# Patient Record
(demographics unavailable — no encounter records)

---

## 2025-05-08 NOTE — PHYSICAL EXAM
[Abdominal Masses] : No abdominal masses [Abdomen Tenderness] : ~T ~M No abdominal tenderness [Alert] : alert [Oriented to Person] : oriented to person [Oriented to Place] : oriented to place [Oriented to Time] : oriented to time [Calm] : calm [de-identified] : She  is alert, well-groomed, and in NAD   [de-identified] : anicteric.  Nasal mucosa pink, septum midline. Oral mucosa pink.  Tongue midline, Pharynx without exudates.   [de-identified] : Neck supple. Trachea midline. Thyroid isthmus barely palpable, lobes not felt.   [de-identified] : no hernia

## 2025-05-08 NOTE — PLAN
[FreeTextEntry1] : Ms. ZARATE  was told significance of findings, options, risks and benefits were explained.  Informed consent for laparoscopic/possible open  cholecystectomy  and potential risks, benefits and alternatives (surgical options were discussed including non-surgical options or the option of no surgery) to the planned surgery were discussed in depth.  All surgical options were discussed including non-surgical treatments.  She wishes to proceed with surgery.  We will plan for surgery on at the next available date, pending any required insurance pre-certification or pre-approval. She agrees to obtain any necessary pre-operative evaluations and testing prior to surgery. Patient instructed to maintain a fat-free diet, and to seek immediate medical attention with any acute change or worsening of symptoms, including but not limited to abdominal pain, fever, chills, nausea, vomiting, or yellowing of the skin.  Patient advised to seek immediate medical attention with any acute change in symptoms or with the development of any new or worsening symptoms.  Patient's questions and concerns addressed to patient's satisfaction, and patient verbalized an understanding of the information discussed.

## 2025-05-08 NOTE — HISTORY OF PRESENT ILLNESS
[de-identified] : Ms. JOY ZARATE is a 51 year  old patient who was referred by Dr. Ryan Keating      She had an abdominal sonogram on  04/03/2025 which revealed Cholelithiasis and gallbladder adenomyomatosis. CBD is normal      [de-identified] : Ms. JOY ZARATE is a 51 year  old patient who was referred by Dr. Ryan Keating    with the chief complaint of having right upper quadrant and epigastric pain on and off  for 1 month. Pain is non-radiating.    She reports no nausea or vomiting and no history of jaundice, acholia or acholuria.   Appetite is good and weight is stable.   She   has  family history of biliary tract disease in her mother.     She had an abdominal sonogram on  04/03/2025 which revealed Cholelithiasis and gallbladder adenomyomatosis. CBD is normal

## 2025-05-08 NOTE — DATA REVIEWED
[FreeTextEntry1] : Patient Name JOY ZARATE Date of Birth 1974-04-09 Procedure US ABDOMEN Study Date & Time 2025-04-03 8:08 AM Patient ID [id number] Accession Number [id number] Referring Physician BHARAT VERMA Institution Name [facility] RADIOLOGY REPORT FINDINGS FINDING HISTORY: Right flank pain. TECHNIQUE: Ultrasound of the abdomen was performed. COMPARISON: Abdominal ultrasound 8/18/2020. FINDINGS: Liver: Normal size and echogenicity. No focal liver lesion. Biliary Ducts: CBD measures 6 mm. No ductal dilatation. Gallbladder: Normally distended, containing a 2.4 cm gallstone. No gallbladder wall thickening or pericholecystic fluid. Punctate hyperechoic foci in the gallbladder wall with associated comet tail artifact, likely representing adenomyomatosis. Negative sonographic Jefferson's sign. Pancreas: Visualized portions unremarkable. Spleen: 7.8 cm in length. Right Kidney: 10.4 cm in length. Normal echogenicity. No hydronephrosis. Left Kidney: 10.2 cm in length. Normal echogenicity. No hydronephrosis. Aorta and IVC: Visualized portions are unremarkable. Other: No ascites. IMPRESSION: Cholelithiasis and gallbladder adenomyomatosis. No evidence of acute cholecystitis. No renal stones or hydronephrosis identified. Electronically signed by: Jovani Orta MD 4/3/2025 1:01 PM Workstation: [id number] 5/6/25, 10:33 AM [employer] [email or web address] Electronically Signed By: Jovani Orta MD Sign Date: 03-APR-25 [facility]

## 2025-05-08 NOTE — PHYSICAL EXAM
[Abdominal Masses] : No abdominal masses [Abdomen Tenderness] : ~T ~M No abdominal tenderness [Alert] : alert [Oriented to Person] : oriented to person [Oriented to Place] : oriented to place [Oriented to Time] : oriented to time [Calm] : calm [de-identified] : She  is alert, well-groomed, and in NAD   [de-identified] : anicteric.  Nasal mucosa pink, septum midline. Oral mucosa pink.  Tongue midline, Pharynx without exudates.   [de-identified] : Neck supple. Trachea midline. Thyroid isthmus barely palpable, lobes not felt.   [de-identified] : no hernia

## 2025-05-08 NOTE — HISTORY OF PRESENT ILLNESS
[de-identified] : Ms. JOY ZARATE is a 51 year  old patient who was referred by Dr. Ryan Keating      She had an abdominal sonogram on  04/03/2025 which revealed Cholelithiasis and gallbladder adenomyomatosis. CBD is normal      [de-identified] : Ms. JOY ZARATE is a 51 year  old patient who was referred by Dr. Ryan Keating    with the chief complaint of having right upper quadrant and epigastric pain on and off  for 1 month. Pain is non-radiating.    She reports no nausea or vomiting and no history of jaundice, acholia or acholuria.   Appetite is good and weight is stable.   She   has  family history of biliary tract disease in her mother.     She had an abdominal sonogram on  04/03/2025 which revealed Cholelithiasis and gallbladder adenomyomatosis. CBD is normal

## 2025-05-08 NOTE — DATA REVIEWED
[FreeTextEntry1] : Patient Name JOY ZARATE Date of Birth 1974-04-09 Procedure US ABDOMEN Study Date & Time 2025-04-03 8:08 AM Patient ID [id number] Accession Number [id number] Referring Physician BHARAT VERMA Institution Name [facility] RADIOLOGY REPORT FINDINGS FINDING HISTORY: Right flank pain. TECHNIQUE: Ultrasound of the abdomen was performed. COMPARISON: Abdominal ultrasound 8/18/2020. FINDINGS: Liver: Normal size and echogenicity. No focal liver lesion. Biliary Ducts: CBD measures 6 mm. No ductal dilatation. Gallbladder: Normally distended, containing a 2.4 cm gallstone. No gallbladder wall thickening or pericholecystic fluid. Punctate hyperechoic foci in the gallbladder wall with associated comet tail artifact, likely representing adenomyomatosis. Negative sonographic Jefferson's sign. Pancreas: Visualized portions unremarkable. Spleen: 7.8 cm in length. Right Kidney: 10.4 cm in length. Normal echogenicity. No hydronephrosis. Left Kidney: 10.2 cm in length. Normal echogenicity. No hydronephrosis. Aorta and IVC: Visualized portions are unremarkable. Other: No ascites. IMPRESSION: Cholelithiasis and gallbladder adenomyomatosis. No evidence of acute cholecystitis. No renal stones or hydronephrosis identified. Electronically signed by: Jovani Orta MD 4/3/2025 1:01 PM Workstation: [id number] 5/6/25, 10:33 AM [employer] [email or web address] Electronically Signed By: Jovani Orta MD Sign Date: 03-APR-25 [facility]

## 2025-05-08 NOTE — CONSULT LETTER
[Dear  ___] : Dear  [unfilled], [Consult Letter:] : I had the pleasure of evaluating your patient, [unfilled]. [Please see my note below.] : Please see my note below. [Consult Closing:] : Thank you very much for allowing me to participate in the care of this patient.  If you have any questions, please do not hesitate to contact me. [Sincerely,] : Sincerely, [FreeTextEntry3] : Greg Marsh MD, FACS